# Patient Record
Sex: FEMALE | Race: WHITE | NOT HISPANIC OR LATINO | Employment: OTHER | URBAN - NONMETROPOLITAN AREA
[De-identification: names, ages, dates, MRNs, and addresses within clinical notes are randomized per-mention and may not be internally consistent; named-entity substitution may affect disease eponyms.]

---

## 2017-03-13 ENCOUNTER — OFFICE VISIT (OUTPATIENT)
Dept: CARDIOLOGY | Facility: CLINIC | Age: 82
End: 2017-03-13

## 2017-03-13 VITALS
BODY MASS INDEX: 20.49 KG/M2 | DIASTOLIC BLOOD PRESSURE: 72 MMHG | HEART RATE: 88 BPM | WEIGHT: 123 LBS | HEIGHT: 65 IN | SYSTOLIC BLOOD PRESSURE: 118 MMHG

## 2017-03-13 DIAGNOSIS — R53.82 CHRONIC FATIGUE: ICD-10-CM

## 2017-03-13 DIAGNOSIS — R01.1 MURMUR, CARDIAC: ICD-10-CM

## 2017-03-13 DIAGNOSIS — J90 PLEURAL EFFUSION: ICD-10-CM

## 2017-03-13 DIAGNOSIS — R06.02 SHORTNESS OF BREATH: Primary | ICD-10-CM

## 2017-03-13 DIAGNOSIS — I10 ESSENTIAL HYPERTENSION: ICD-10-CM

## 2017-03-13 DIAGNOSIS — I34.0 NON-RHEUMATIC MITRAL REGURGITATION: ICD-10-CM

## 2017-03-13 PROCEDURE — 99213 OFFICE O/P EST LOW 20 MIN: CPT | Performed by: INTERNAL MEDICINE

## 2017-03-13 RX ORDER — LORATADINE 10 MG/1
10 TABLET, ORALLY DISINTEGRATING ORAL DAILY
Qty: 30 TABLET | Refills: 8 | Status: SHIPPED | OUTPATIENT
Start: 2017-03-13

## 2017-03-13 NOTE — PROGRESS NOTES
Chief Complaint   Patient presents with   • Follow-up     Denies palpitations. PCP refills meds. Labs per PCP today.    • Shortness of Breath     Has had chest congestion. Also has tightness in her chest but is unsure if this is related to the congestion or not.        CARDIAC COMPLAINTS  dyspnea        Subjective   Valerie Berg is a 89 y.o. female came in today for her follow-up visit . She has history of chronic atrial fibrillation, mitral regurgitation, pulmonary hypertension who also has history of pleural effusion in the past.  Her last echocardiogram showed the PA pressures still remains the same around 50.  Her x-ray chest showed mild to moderate pleural effusion.  She came today with some occasional chest tightness and congestion.  She apparently had labs done today and not sure about the results.              Cardiac History  Past Surgical History   Procedure Laterality Date   • Echo - converted  02/25/2011     (DR. Stallworth/Frandy) EF 50% Septal WMA. RVSP- 45mmHg   • Ep study  03/16/2011     Dr. Nair:  Unsuccessful ablation of (L) Focus.   • Echo - converted  04/19/2012     Dr. Stallworth /Frandy: EF 40% RVSP 64mmHg   • Echo - converted  10/11/2012     Dr. Basim Broderick: EF 60% RVSP 58mmHg   • Echo - converted  01/24/2013     EF 50-55%, RVSP- 66 mmHg   • Cardiovascular stress test  01/24/2013     [facility] Myoview:  Negative   • Echo - converted  04/09/2015     EF 65%, RVSP 70mmHg.   • Echo - converted  09/28/2016     EF 60%. Mod MR. RVSP- 53 mmHg       Current Outpatient Prescriptions   Medication Sig Dispense Refill   • carvedilol (COREG) 12.5 MG tablet Take 12.5 mg by mouth 2 (two) times a day with meals.     • furosemide (LASIX) 20 MG tablet Take one tablet on Mon, Wed, and Fri     • methocarbamol (ROBAXIN) 500 MG tablet Take 500 mg by mouth at night as needed for muscle spasms.     • warfarin (COUMADIN) 3 MG tablet Take 3 mg by mouth daily. Everyday except Monday and Thursday     • Warfarin Sodium (COUMADIN  PO) Take 4.5mg on Monday and Thursday     • loratadine (CLARITIN REDITABS) 10 MG disintegrating tablet Take 1 tablet by mouth Daily. 30 tablet 8     No current facility-administered medications for this visit.        Allergies:  Statins and Vioxx [rofecoxib]      Past Medical History   Diagnosis Date   • Chronic a-fib    • H/O eye surgery      Catract Surgery   • H/O: hysterectomy    • History of cholecystectomy    • History of thyroid surgery    • History of tonsillectomy    • Hyperlipidemia    • Hypertension    • Mitral valve insufficiency    • Tachycardia        Social History     Social History   • Marital status:      Spouse name: N/A   • Number of children: N/A   • Years of education: N/A     Occupational History   • Not on file.     Social History Main Topics   • Smoking status: Never Smoker   • Smokeless tobacco: Never Used   • Alcohol use No   • Drug use: No   • Sexual activity: Not on file     Other Topics Concern   • Not on file     Social History Narrative       Family History   Problem Relation Age of Onset   • Stroke Mother    • Heart attack Father    • Hypertension Daughter    • Hypertension Son    • Other Other      two children- daughter has slight flutter...       Review of Systems   Constitutional: Positive for fatigue. Negative for activity change.   HENT: Positive for congestion and sinus pressure.    Respiratory: Positive for chest tightness and shortness of breath.    Cardiovascular: Negative for chest pain.   Gastrointestinal: Negative for abdominal distention.   Endocrine: Negative for cold intolerance.   Genitourinary: Negative for difficulty urinating.   Musculoskeletal: Positive for arthralgias and myalgias.   Allergic/Immunologic: Negative for environmental allergies.   Neurological: Positive for dizziness.   Hematological: Negative for adenopathy.   Psychiatric/Behavioral: Negative for agitation.       Diabetes- No  Thyroid- normal    Objective     Visit Vitals   • /72   •  "Pulse 88   • Ht 65\" (165.1 cm)   • Wt 123 lb (55.8 kg)   • BMI 20.47 kg/m2       Physical Exam   Constitutional: She appears well-developed.   HENT:   Head: Normocephalic.   Eyes: Pupils are equal, round, and reactive to light.   Neck: Normal range of motion.   Cardiovascular: An irregularly irregular rhythm present.   Murmur heard.   Systolic murmur is present with a grade of 4/6   Pulmonary/Chest: She has decreased breath sounds in the right middle field, the right lower field and the left lower field.   Abdominal: Soft.   Musculoskeletal: Normal range of motion.   Neurological: She is alert.   Skin: Skin is warm.   Psychiatric: She has a normal mood and affect.       Procedures          Assessment/Plan    her heart rate is stable , irregular .  Blood pressure is normal .  Her clinical examination that show diminished air entry .  I advised her to get another x-ray chest .  If there is moderate effusion , she may need to see the pulmonologist and needed pleural tap .  I explained to her and the daughter about the echo findings .  Cardiac status appears to be stable .  Continue the same medications .  Valerie was seen today for follow-up and shortness of breath.    Diagnoses and all orders for this visit:    Shortness of breath  -     XR Chest 2 View; Future    Chronic fatigue    Essential hypertension    Murmur, cardiac    Non-rheumatic mitral regurgitation    Pleural effusion  -     XR Chest 2 View; Future    Other orders  -     loratadine (CLARITIN REDITABS) 10 MG disintegrating tablet; Take 1 tablet by mouth Daily.                         Electronically signed by Sunita Hoffman MD March 13, 2017 4:49 PM      "

## 2017-06-12 ENCOUNTER — TELEPHONE (OUTPATIENT)
Dept: CARDIOLOGY | Facility: CLINIC | Age: 82
End: 2017-06-12

## 2017-06-12 ENCOUNTER — DOCUMENTATION (OUTPATIENT)
Dept: CARDIOLOGY | Facility: CLINIC | Age: 82
End: 2017-06-12

## 2017-06-12 NOTE — TELEPHONE ENCOUNTER
Lissa with Dr. Hurt called stating that he is wanting to optimize her Lasix, but would like her seen sooner for follow-up.

## 2017-06-13 NOTE — TELEPHONE ENCOUNTER
Appointment scheduled for 6-23-17 @ 8 am with Huber Alcaraz with Dr. Hurt office informed. She will call patient.